# Patient Record
Sex: MALE | Race: BLACK OR AFRICAN AMERICAN | Employment: OTHER | URBAN - METROPOLITAN AREA
[De-identification: names, ages, dates, MRNs, and addresses within clinical notes are randomized per-mention and may not be internally consistent; named-entity substitution may affect disease eponyms.]

---

## 2019-05-08 ENCOUNTER — OFFICE VISIT (OUTPATIENT)
Dept: CARDIOLOGY CLINIC | Age: 74
End: 2019-05-08

## 2019-05-08 ENCOUNTER — HOSPITAL ENCOUNTER (OUTPATIENT)
Dept: LAB | Age: 74
Discharge: HOME | End: 2019-05-08
Payer: OTHER GOVERNMENT

## 2019-05-08 ENCOUNTER — DOCUMENTATION ONLY (OUTPATIENT)
Dept: CARDIOLOGY CLINIC | Age: 74
End: 2019-05-08

## 2019-05-08 VITALS
WEIGHT: 167 LBS | SYSTOLIC BLOOD PRESSURE: 110 MMHG | DIASTOLIC BLOOD PRESSURE: 70 MMHG | HEART RATE: 91 BPM | OXYGEN SATURATION: 96 %

## 2019-05-08 DIAGNOSIS — E78.5 DYSLIPIDEMIA: ICD-10-CM

## 2019-05-08 DIAGNOSIS — K21.9 GASTROESOPHAGEAL REFLUX DISEASE, ESOPHAGITIS PRESENCE NOT SPECIFIED: ICD-10-CM

## 2019-05-08 DIAGNOSIS — I25.118 CORONARY ARTERY DISEASE WITH STABLE ANGINA PECTORIS, UNSPECIFIED VESSEL OR LESION TYPE, UNSPECIFIED WHETHER NATIVE OR TRANSPLANTED HEART (HCC): Primary | ICD-10-CM

## 2019-05-08 DIAGNOSIS — Z91.89 QUIT USING TOBACCO IN REMOTE PAST: ICD-10-CM

## 2019-05-08 DIAGNOSIS — Z82.49 FAMILY HISTORY OF PREMATURE CAD: ICD-10-CM

## 2019-05-08 DIAGNOSIS — R94.39 ABNORMAL NUCLEAR STRESS TEST: ICD-10-CM

## 2019-05-08 DIAGNOSIS — R94.31 ABNORMAL ECG: ICD-10-CM

## 2019-05-08 DIAGNOSIS — R07.89 OTHER CHEST PAIN: ICD-10-CM

## 2019-05-08 LAB
ALBUMIN SERPL-MCNC: 4.1 G/DL (ref 3.4–5)
ALBUMIN/GLOB SERPL: 1.4 {RATIO} (ref 0.8–1.7)
ALP SERPL-CCNC: 53 U/L (ref 45–117)
ALT SERPL-CCNC: 44 U/L (ref 16–61)
ANION GAP SERPL CALC-SCNC: 4 MMOL/L (ref 3–18)
AST SERPL-CCNC: 39 U/L (ref 15–37)
BASOPHILS # BLD: 0 K/UL (ref 0–0.1)
BASOPHILS NFR BLD: 1 % (ref 0–2)
BILIRUB SERPL-MCNC: 0.5 MG/DL (ref 0.2–1)
BUN SERPL-MCNC: 19 MG/DL (ref 7–18)
BUN/CREAT SERPL: 14 (ref 12–20)
CALCIUM SERPL-MCNC: 9.6 MG/DL (ref 8.5–10.1)
CHLORIDE SERPL-SCNC: 110 MMOL/L (ref 100–108)
CO2 SERPL-SCNC: 26 MMOL/L (ref 21–32)
CREAT SERPL-MCNC: 1.37 MG/DL (ref 0.6–1.3)
DIFFERENTIAL METHOD BLD: ABNORMAL
EOSINOPHIL # BLD: 0.1 K/UL (ref 0–0.4)
EOSINOPHIL NFR BLD: 2 % (ref 0–5)
ERYTHROCYTE [DISTWIDTH] IN BLOOD BY AUTOMATED COUNT: 14.6 % (ref 11.6–14.5)
GLOBULIN SER CALC-MCNC: 2.9 G/DL (ref 2–4)
GLUCOSE SERPL-MCNC: 80 MG/DL (ref 74–99)
HCT VFR BLD AUTO: 40.4 % (ref 36–48)
HGB BLD-MCNC: 13.5 G/DL (ref 13–16)
INR PPP: 1 (ref 0.8–1.2)
LYMPHOCYTES # BLD: 1.1 K/UL (ref 0.9–3.6)
LYMPHOCYTES NFR BLD: 18 % (ref 21–52)
MCH RBC QN AUTO: 29.9 PG (ref 24–34)
MCHC RBC AUTO-ENTMCNC: 33.4 G/DL (ref 31–37)
MCV RBC AUTO: 89.6 FL (ref 74–97)
MONOCYTES # BLD: 0.5 K/UL (ref 0.05–1.2)
MONOCYTES NFR BLD: 7 % (ref 3–10)
NEUTS SEG # BLD: 4.6 K/UL (ref 1.8–8)
NEUTS SEG NFR BLD: 72 % (ref 40–73)
PLATELET # BLD AUTO: 186 K/UL (ref 135–420)
PMV BLD AUTO: 11 FL (ref 9.2–11.8)
POTASSIUM SERPL-SCNC: 3.9 MMOL/L (ref 3.5–5.5)
PROT SERPL-MCNC: 7 G/DL (ref 6.4–8.2)
PROTHROMBIN TIME: 12.4 SEC (ref 11.5–15.2)
RBC # BLD AUTO: 4.51 M/UL (ref 4.7–5.5)
SODIUM SERPL-SCNC: 140 MMOL/L (ref 136–145)
WBC # BLD AUTO: 6.3 K/UL (ref 4.6–13.2)

## 2019-05-08 PROCEDURE — 85025 COMPLETE CBC W/AUTO DIFF WBC: CPT

## 2019-05-08 PROCEDURE — 80053 COMPREHEN METABOLIC PANEL: CPT

## 2019-05-08 PROCEDURE — 85610 PROTHROMBIN TIME: CPT

## 2019-05-08 PROCEDURE — 36415 COLL VENOUS BLD VENIPUNCTURE: CPT

## 2019-05-08 RX ORDER — METOPROLOL SUCCINATE 25 MG/1
TABLET, EXTENDED RELEASE ORAL DAILY
COMMUNITY

## 2019-05-08 RX ORDER — IPRATROPIUM BROMIDE AND ALBUTEROL SULFATE 2.5; .5 MG/3ML; MG/3ML
3 SOLUTION RESPIRATORY (INHALATION)
COMMUNITY

## 2019-05-08 RX ORDER — BISMUTH SUBSALICYLATE 262 MG
1 TABLET,CHEWABLE ORAL DAILY
COMMUNITY

## 2019-05-08 RX ORDER — GUAIFENESIN 200 MG/1
200 TABLET ORAL
COMMUNITY

## 2019-05-08 RX ORDER — CETIRIZINE HCL 10 MG
TABLET ORAL
COMMUNITY

## 2019-05-08 RX ORDER — FINASTERIDE 5 MG/1
5 TABLET, FILM COATED ORAL DAILY
COMMUNITY

## 2019-05-08 RX ORDER — NORTRIPTYLINE HYDROCHLORIDE 25 MG/1
CAPSULE ORAL
COMMUNITY

## 2019-05-08 RX ORDER — ACETAMINOPHEN 500 MG
TABLET ORAL
COMMUNITY

## 2019-05-08 RX ORDER — ATORVASTATIN CALCIUM 40 MG/1
20 TABLET, FILM COATED ORAL DAILY
COMMUNITY

## 2019-05-08 RX ORDER — BUDESONIDE AND FORMOTEROL FUMARATE DIHYDRATE 160; 4.5 UG/1; UG/1
2 AEROSOL RESPIRATORY (INHALATION) 2 TIMES DAILY
COMMUNITY

## 2019-05-08 RX ORDER — TAMSULOSIN HYDROCHLORIDE 0.4 MG/1
0.4 CAPSULE ORAL DAILY
COMMUNITY

## 2019-05-08 RX ORDER — CHLORHEXIDINE GLUCONATE 1.2 MG/ML
15 RINSE ORAL 2 TIMES DAILY
COMMUNITY

## 2019-05-08 RX ORDER — ASPIRIN 81 MG/1
TABLET ORAL DAILY
COMMUNITY

## 2019-05-08 RX ORDER — OMEPRAZOLE 20 MG/1
20 CAPSULE, DELAYED RELEASE ORAL DAILY
COMMUNITY

## 2019-05-08 NOTE — PROGRESS NOTES
1. Have you been to the ER, urgent care clinic since your last visit? Hospitalized since your last visit? No  
 
2. Have you seen or consulted any other health care providers outside of the [address] [place] [address] since your last visit? Include any pap smears or colon screening. yes

## 2019-05-08 NOTE — PATIENT INSTRUCTIONS
Instructions Patients Name:   
 
1. You are scheduled to have a heart cath on May 16, 2019  at 9:00am Please check in at 7:00am.  
 
Please go to [facility] in the outpatient parking lot. Once you enter check in with the  there. The  will either give you directions or assist you in getting to the cath holding area. ER to second floor 2. You are not to eat or drink anything after midnight the morning of the procedure. 4. If you are diabetic, do not take your insulin/sugar pill the morning of the procedure. 5. MEDICATION INSTRUCTIONS:   Please take your morning medications with the following special instructions: 
 
[x]          Please make sure to take your Blood pressure medication : Metoprolol. [x]          Take your Aspirin Pre -procedure LABWORK is to be done within one week of your procedure date 6. We encourage families to wait in the waiting room on the first floor while the procedure is being done. The Doctor will come out and talk with you as soon as the procedure is over. 7. There is the possibility that you may spend the night in the hospital, depending on the results of the procedure. This will be determined after the procedure is done. If angioplasty or stent is planned, you will stay at least one day. 8. If you or your family have any questions, please call our office Monday Friday, 9:00 a. m.4:30 p.m.,  At [phone], and ask to speak to one of the nurses. 9. Be sure you have someone to drive you home, you will not be able to drive after the procedure.

## 2019-05-08 NOTE — PROGRESS NOTES
Cath is approved through [facility] - see auth scanned in.  CPT code 32210

## 2019-05-12 PROBLEM — Z82.49 FAMILY HISTORY OF PREMATURE CAD: Status: ACTIVE | Noted: 2019-05-12

## 2019-05-12 PROBLEM — R07.89 OTHER CHEST PAIN: Status: ACTIVE | Noted: 2019-05-12

## 2019-05-12 PROBLEM — E78.5 DYSLIPIDEMIA: Status: ACTIVE | Noted: 2019-05-12

## 2019-05-12 PROBLEM — Z91.89 QUIT USING TOBACCO IN REMOTE PAST: Status: ACTIVE | Noted: 2019-05-12

## 2019-05-12 PROBLEM — R94.31 ABNORMAL ECG: Status: ACTIVE | Noted: 2019-05-12

## 2019-05-12 PROBLEM — K21.9 GERD (GASTROESOPHAGEAL REFLUX DISEASE): Status: ACTIVE | Noted: 2019-05-12

## 2019-05-12 PROBLEM — R94.39 ABNORMAL NUCLEAR STRESS TEST: Status: ACTIVE | Noted: 2019-05-12

## 2019-05-12 RX ORDER — SODIUM CHLORIDE 0.9 % (FLUSH) 0.9 %
5-40 SYRINGE (ML) INJECTION EVERY 8 HOURS
Status: CANCELLED | OUTPATIENT
Start: 2019-05-12

## 2019-05-12 RX ORDER — SODIUM CHLORIDE 0.9 % (FLUSH) 0.9 %
5-40 SYRINGE (ML) INJECTION AS NEEDED
Status: CANCELLED | OUTPATIENT
Start: 2019-05-12

## 2019-05-12 RX ORDER — SODIUM CHLORIDE 9 MG/ML
1000 INJECTION, SOLUTION INTRAVENOUS CONTINUOUS
Status: CANCELLED | OUTPATIENT
Start: 2019-05-12

## 2019-05-12 RX ORDER — ASPIRIN 325 MG
162 TABLET ORAL DAILY
Status: CANCELLED | OUTPATIENT
Start: 2019-05-12

## 2019-05-12 NOTE — PROGRESS NOTES
Subjective:  
   Yasmani Aviles is a 77-year-old man that has been followed for his medical care at the [facility]. The patient had a nuclear stress test done several months ago which was abnormal.  The nuclear stress test was said to have shown a \"large defect\". The patient has not had recent chest pain. The patient did develop pain \"under the collarbone \"which was quite severe approximately a year ago. He has had no recent exertional type chest pain. He does fatigue easily. He has had no limiting shortness of breath, PND or orthopnea. He presently takes metoprolol which he says was given to him to slow his heart rate. Patient's cardiac risk factors are remote smoking/ tobacco exposure, family history, dyslipidemia. Patient Active Problem List  
 Diagnosis Date Noted  Abnormal ECG 05/12/2019  Dyslipidemia 05/12/2019  Family history of premature CAD 05/12/2019  Quit using tobacco in remote past 05/12/2019  GERD (gastroesophageal reflux disease) 05/12/2019  Abnormal nuclear stress test 05/12/2019  Other chest pain 05/12/2019 Current Outpatient Medications Medication Sig Dispense Refill  acetaminophen (TYLENOL) 500 mg tablet Take  by mouth every six (6) hours as needed for Pain.  aspirin delayed-release 81 mg tablet Take  by mouth daily.  budesonide-formoterol (SYMBICORT) 160-4.5 mcg/actuation HFAA Take 2 Puffs by inhalation two (2) times a day.  chlorhexidine (PERIDEX) 0.12 % solution 15 mL by Swish and Spit route two (2) times a day.  guaiFENesin (ORGANIDIN) 200 mg tablet Take 200 mg by mouth every four (4) hours as needed for Congestion.  metoprolol succinate (TOPROL-XL) 25 mg XL tablet Take  by mouth daily.  multivitamin (ONE A DAY) tablet Take 1 Tab by mouth daily.  vit A/vit C/vit E/zinc/copper (PRESERVISION AREDS PO) Take  by mouth.  nortriptyline (PAMELOR) 25 mg capsule Take  by mouth nightly.  omeprazole (PRILOSEC) 20 mg capsule Take 20 mg by mouth daily.  omega-3 fatty acids (FISH OIL CONCENTRATE PO) Take  by mouth.  atorvastatin (LIPITOR) 40 mg tablet Take  by mouth daily.  cetirizine (ZYRTEC) 10 mg tablet Take  by mouth.  finasteride (PROSCAR) 5 mg tablet Take 5 mg by mouth daily.  tamsulosin (FLOMAX) 0.4 mg capsule Take 0.4 mg by mouth daily.  albuterol-ipratropium (DUO-NEB) 2.5 mg-0.5 mg/3 ml nebu 3 mL by Nebulization route. No Known Allergies No past medical history on file. No past surgical history on file. No family history on file. Social History Tobacco Use Smoking Status Former Smoker Smokeless Tobacco Never Used Review of Systems, additional: 
Constitutional: negative Eyes: negative Respiratory: negative Cardiovascular: positive for fatigue Gastrointestinal: negative Musculoskeletal:negative Neurological: negative Behvioral/Psych: negative Endocrine: negative ENT: negative Objective:  
 
Visit Vitals /70 Pulse 91 Wt 167 lb (75.8 kg) SpO2 96% General:  alert, cooperative, no distress Chest Wall: inspection normal - no chest wall deformities or tenderness, respiratory effort normal  
Lung: clear to auscultation bilaterally Heart:  normal rate and regular rhythm, S1 and S2 normal, no murmurs noted, no gallops noted, no JVD Abdomen: soft, non-tender. Bowel sounds normal. No masses,  no organomegaly Extremities: extremities normal, atraumatic, no cyanosis or edema Skin: no rashes Neuro: alert, oriented, normal speech, no focal findings or movement disorder noted EK2019. Sinus rhythm. PVC. Diffuse nondiagnostic ST and T wave abnormalities. Assessment/Plan: ICD-10-CM ICD-9-CM 1.  Coronary artery disease with stable angina pectoris, unspecified vessel or lesion type, unspecified whether native or transplanted heart ([facility] [address] will arrange for cardiac catheterization. Risks and benefits as well as alternatives discussed with the patient today. Patient is in agreement and wishes to proceed I25.118 414.00 AMB POC EKG ROUTINE W/ 12 LEADS, INTER & REP  
  413.9 CBC WITH AUTOMATED DIFF  
   METABOLIC PANEL, COMPREHENSIVE PROTHROMBIN TIME + INR  
2. Abnormal ECG, diffuse nondiagnostic ST and T wave abnormalities. PVCs. R94.31 794.31   
3. Quit using tobacco in remote past, none x13 years Z91.89 V15.89   
4. Gastroesophageal reflux disease, esophagitis presence not specified K21.9 530.81   
5. Dyslipidemia, lipid profile unavailable, patient history E78.5 272.4 6. Family history of premature CAD Z82.49 V17.3 7. Abnormal nuclear stress test, \"large defect \" R94.39 794.39   
8.  Other chest pain, remote R07.89 786.59

## 2019-05-16 ENCOUNTER — HOSPITAL ENCOUNTER (OUTPATIENT)
Age: 74
Setting detail: OUTPATIENT SURGERY
Discharge: HOME | End: 2019-05-16
Attending: INTERNAL MEDICINE | Admitting: INTERNAL MEDICINE
Payer: COMMERCIAL

## 2019-05-16 VITALS
HEART RATE: 90 BPM | BODY MASS INDEX: 23.28 KG/M2 | WEIGHT: 166.31 LBS | HEIGHT: 71 IN | RESPIRATION RATE: 18 BRPM | SYSTOLIC BLOOD PRESSURE: 132 MMHG | OXYGEN SATURATION: 99 % | DIASTOLIC BLOOD PRESSURE: 83 MMHG | TEMPERATURE: 97.5 F

## 2019-05-16 DIAGNOSIS — R94.39 ABNORMAL NUCLEAR STRESS TEST: ICD-10-CM

## 2019-05-16 PROCEDURE — 76210000008 HC OR PH I REC 6 TO 6.5 HR: Performed by: INTERNAL MEDICINE

## 2019-05-16 PROCEDURE — 77030016699 HC CATH ANGI DX INFN1 CARD -A: Performed by: INTERNAL MEDICINE

## 2019-05-16 PROCEDURE — 77030012597: Performed by: INTERNAL MEDICINE

## 2019-05-16 PROCEDURE — C1894 INTRO/SHEATH, NON-LASER: HCPCS | Performed by: INTERNAL MEDICINE

## 2019-05-16 PROCEDURE — 74011250636 HC RX REV CODE- 250/636

## 2019-05-16 PROCEDURE — 77030013761 HC KT HRT LFT ANGI -B: Performed by: INTERNAL MEDICINE

## 2019-05-16 PROCEDURE — 77030013797 HC KT TRNSDUC PRSSR EDWD -A: Performed by: INTERNAL MEDICINE

## 2019-05-16 PROCEDURE — C1760 CLOSURE DEV, VASC: HCPCS | Performed by: INTERNAL MEDICINE

## 2019-05-16 PROCEDURE — 93458 L HRT ARTERY/VENTRICLE ANGIO: CPT | Performed by: INTERNAL MEDICINE

## 2019-05-16 PROCEDURE — 74011250637 HC RX REV CODE- 250/637: Performed by: PHYSICIAN ASSISTANT

## 2019-05-16 PROCEDURE — 74011000258 HC RX REV CODE- 258: Performed by: INTERNAL MEDICINE

## 2019-05-16 PROCEDURE — 77030003629 HC NDL PERC VASC COOK -A: Performed by: INTERNAL MEDICINE

## 2019-05-16 PROCEDURE — 74011636320 HC RX REV CODE- 636/320: Performed by: INTERNAL MEDICINE

## 2019-05-16 PROCEDURE — 74011250636 HC RX REV CODE- 250/636: Performed by: INTERNAL MEDICINE

## 2019-05-16 PROCEDURE — 99152 MOD SED SAME PHYS/QHP 5/>YRS: CPT | Performed by: INTERNAL MEDICINE

## 2019-05-16 RX ORDER — SODIUM CHLORIDE 0.9 % (FLUSH) 0.9 %
5-40 SYRINGE (ML) INJECTION EVERY 8 HOURS
Status: DISCONTINUED | OUTPATIENT
Start: 2019-05-16 | End: 2019-05-16 | Stop reason: HOSPADM

## 2019-05-16 RX ORDER — ACETAMINOPHEN 325 MG/1
650 TABLET ORAL EVERY 8 HOURS
Status: DISCONTINUED | OUTPATIENT
Start: 2019-05-16 | End: 2019-05-20 | Stop reason: HOSPADM

## 2019-05-16 RX ORDER — SODIUM CHLORIDE 0.9 % (FLUSH) 0.9 %
5-40 SYRINGE (ML) INJECTION AS NEEDED
Status: DISCONTINUED | OUTPATIENT
Start: 2019-05-16 | End: 2019-05-20 | Stop reason: HOSPADM

## 2019-05-16 RX ORDER — LIDOCAINE HYDROCHLORIDE 10 MG/ML
INJECTION, SOLUTION EPIDURAL; INFILTRATION; INTRACAUDAL; PERINEURAL AS NEEDED
Status: DISCONTINUED | OUTPATIENT
Start: 2019-05-16 | End: 2019-05-16 | Stop reason: HOSPADM

## 2019-05-16 RX ORDER — SODIUM CHLORIDE 9 MG/ML
1000 INJECTION, SOLUTION INTRAVENOUS CONTINUOUS
Status: DISCONTINUED | OUTPATIENT
Start: 2019-05-16 | End: 2019-05-16 | Stop reason: HOSPADM

## 2019-05-16 RX ORDER — HEPARIN SODIUM 200 [USP'U]/100ML
INJECTION, SOLUTION INTRAVENOUS AS NEEDED
Status: DISCONTINUED | OUTPATIENT
Start: 2019-05-16 | End: 2019-05-16 | Stop reason: HOSPADM

## 2019-05-16 RX ORDER — SODIUM CHLORIDE 450 MG/100ML
75 INJECTION, SOLUTION INTRAVENOUS CONTINUOUS
Status: DISCONTINUED | OUTPATIENT
Start: 2019-05-16 | End: 2019-05-20 | Stop reason: HOSPADM

## 2019-05-16 RX ORDER — FENTANYL CITRATE 50 UG/ML
INJECTION, SOLUTION INTRAMUSCULAR; INTRAVENOUS AS NEEDED
Status: DISCONTINUED | OUTPATIENT
Start: 2019-05-16 | End: 2019-05-16 | Stop reason: HOSPADM

## 2019-05-16 RX ORDER — MIDAZOLAM HYDROCHLORIDE 1 MG/ML
INJECTION, SOLUTION INTRAMUSCULAR; INTRAVENOUS AS NEEDED
Status: DISCONTINUED | OUTPATIENT
Start: 2019-05-16 | End: 2019-05-16 | Stop reason: HOSPADM

## 2019-05-16 RX ORDER — GUAIFENESIN 100 MG/5ML
162 LIQUID (ML) ORAL DAILY
Status: DISCONTINUED | OUTPATIENT
Start: 2019-05-16 | End: 2019-05-16 | Stop reason: HOSPADM

## 2019-05-16 RX ORDER — IODIXANOL 320 MG/ML
INJECTION, SOLUTION INTRAVASCULAR AS NEEDED
Status: DISCONTINUED | OUTPATIENT
Start: 2019-05-16 | End: 2019-05-16 | Stop reason: HOSPADM

## 2019-05-16 RX ORDER — SODIUM CHLORIDE 0.9 % (FLUSH) 0.9 %
5-40 SYRINGE (ML) INJECTION EVERY 8 HOURS
Status: DISCONTINUED | OUTPATIENT
Start: 2019-05-16 | End: 2019-05-20 | Stop reason: HOSPADM

## 2019-05-16 RX ORDER — SODIUM CHLORIDE 0.9 % (FLUSH) 0.9 %
5-40 SYRINGE (ML) INJECTION AS NEEDED
Status: DISCONTINUED | OUTPATIENT
Start: 2019-05-16 | End: 2019-05-16 | Stop reason: HOSPADM

## 2019-05-16 RX ADMIN — ACETAMINOPHEN 650 MG: 325 TABLET, FILM COATED ORAL at 13:21

## 2019-05-16 RX ADMIN — SODIUM CHLORIDE 1000 ML: 900 INJECTION, SOLUTION INTRAVENOUS at 08:24

## 2019-05-16 RX ADMIN — SODIUM CHLORIDE 75 ML/HR: 450 INJECTION, SOLUTION INTRAVENOUS at 11:41

## 2019-05-16 NOTE — PROGRESS NOTES
conducted a pre-surgery visit with Jessa Huerta, who is a 68 y. o.,male. The  provided the following Interventions: 
Initiated a relationship of care and support. Offered prayer and assurance of continued prayers on patient's behalf.  assisted patient with the completion of an advance directive. Today. Copied and charted. Plan: 
Chaplains will continue to follow and will provide pastoral care on an as needed/requested basis.  recommends bedside caregivers page  on duty if patient shows signs of acute spiritual or emotional distress. Dennis 3 Board Certified [employer] [facility]  
[phone]

## 2019-05-16 NOTE — Clinical Note
TRANSFER - OUT REPORT:  
 
Verbal report given to: Wilmar Vasquez. Report consisted of patient's Situation, Background, Assessment and  
Recommendations(SBAR). Opportunity for questions and clarification was provided. Patient transported with a Cardiac Cath Tech / Patient Care Tech. Patient transported to: PACU.

## 2019-05-16 NOTE — H&P
Subjective:  
   Clayton Palencia is a 66-year-old man that has been followed for his medical care at the [address] [place] [address] in [place]. The patient had a nuclear stress test done several months ago which was abnormal.  The nuclear stress test was said to have shown a \"large defect\". The patient has not had recent chest pain. The patient did develop pain \"under the collarbone \"which was quite severe approximately a year ago. He has had no recent exertional type chest pain. He does fatigue easily. He has had no limiting shortness of breath, PND or orthopnea. He presently takes metoprolol which he says was given to him to slow his heart rate. Patient's cardiac risk factors are remote smoking/ tobacco exposure, family history, dyslipidemia.   
  
  
    
Patient Active Problem List  
  Diagnosis Date Noted  Abnormal ECG 05/12/2019  Dyslipidemia 05/12/2019  Family history of premature CAD 05/12/2019  Quit using tobacco in remote past 05/12/2019  GERD (gastroesophageal reflux disease) 05/12/2019  Abnormal nuclear stress test 05/12/2019  Other chest pain 05/12/2019  
  
      
Current Outpatient Medications Medication Sig Dispense Refill  acetaminophen (TYLENOL) 500 mg tablet Take  by mouth every six (6) hours as needed for Pain.      
 aspirin delayed-release 81 mg tablet Take  by mouth daily.      
 budesonide-formoterol (SYMBICORT) 160-4.5 mcg/actuation HFAA Take 2 Puffs by inhalation two (2) times a day.      
 chlorhexidine (PERIDEX) 0.12 % solution 15 mL by Swish and Spit route two (2) times a day.      
 guaiFENesin (ORGANIDIN) 200 mg tablet Take 200 mg by mouth every four (4) hours as needed for Congestion.      
 metoprolol succinate (TOPROL-XL) 25 mg XL tablet Take  by mouth daily.      
 multivitamin (ONE A DAY) tablet Take 1 Tab by mouth daily.      
 vit A/vit C/vit E/zinc/copper (PRESERVISION AREDS PO) Take  by mouth.      
  nortriptyline (PAMELOR) 25 mg capsule Take  by mouth nightly.      
 omeprazole (PRILOSEC) 20 mg capsule Take 20 mg by mouth daily.      
 omega-3 fatty acids (FISH OIL CONCENTRATE PO) Take  by mouth.      
 atorvastatin (LIPITOR) 40 mg tablet Take  by mouth daily.      
 cetirizine (ZYRTEC) 10 mg tablet Take  by mouth.      
 finasteride (PROSCAR) 5 mg tablet Take 5 mg by mouth daily.      
 tamsulosin (FLOMAX) 0.4 mg capsule Take 0.4 mg by mouth daily.      
 albuterol-ipratropium (DUO-NEB) 2.5 mg-0.5 mg/3 ml nebu 3 mL by Nebulization route.      
  
No Known Allergies No past medical history on file. No past surgical history on file. No family history on file. Social History  
  
   
Tobacco Use Smoking Status Former Smoker Smokeless Tobacco Never Used  
  
  
  
Review of Systems, additional: 
Constitutional: negative Eyes: negative Respiratory: negative Cardiovascular: positive for fatigue Gastrointestinal: negative Musculoskeletal:negative Neurological: negative Behvioral/Psych: negative Endocrine: negative ENT: negative 
  
Objective:  
  
Visit Vitals /70 Pulse 91 Wt 167 lb (75.8 kg) SpO2 96%  
  
General:  alert, cooperative, no distress Chest Wall: inspection normal - no chest wall deformities or tenderness, respiratory effort normal  
Lung: clear to auscultation bilaterally Heart:  normal rate and regular rhythm, S1 and S2 normal, no murmurs noted, no gallops noted, no JVD Abdomen: soft, non-tender. Bowel sounds normal. No masses,  no organomegaly Extremities: extremities normal, atraumatic, no cyanosis or edema Skin: no rashes Neuro: alert, oriented, normal speech, no focal findings or movement disorder noted  
  
EK2019. Sinus rhythm. PVC. Diffuse nondiagnostic ST and T wave abnormalities. 
  
Assessment/Plan:  
  
    ICD-10-CM ICD-9-CM    
1.  Coronary artery disease with stable angina pectoris, unspecified vessel or lesion type, unspecified whether native or transplanted heart [facility]),  patient admitted for cardiac catheterization. Risks and benefits as well as alternatives have been discussed with the patient in the office. Patient is in agreement today and wishes to proceed I25.118 414.00 AMB POC EKG ROUTINE W/ 12 LEADS, INTER & REP  
    413.9 CBC WITH AUTOMATED DIFF  
      METABOLIC PANEL, COMPREHENSIVE  
      PROTHROMBIN TIME + INR  
2. Abnormal ECG, diffuse nondiagnostic ST and T wave abnormalities. PVCs. R94.31 794.31    
3. Quit using tobacco in remote past, none x13 years Z91.89 V15.89    
4. Gastroesophageal reflux disease, esophagitis presence not specified K21.9 530.81    
5. Dyslipidemia, lipid profile unavailable, patient history E78.5 272.4    
6. Family history of premature CAD Z82.49 V17.3    
7. Abnormal nuclear stress test, \"large defect \" R94.39 794.39    
8. Other chest pain, remote R07.89 786.59    
  
  
  
  
  
Note Details

## 2019-05-16 NOTE — Clinical Note
Bilateral groin clipped, prepped with ChloraPrep and draped. Safety belt applied to patient.  O2 at 2 liters via NC

## 2019-05-16 NOTE — PERIOP NOTES
Recd care of pt from Cath lab on stretcher. Denies pain or discomforts. Attached to monitor. VSS. No bleeding, swelling or hematoma noted right groin. Pulses palpable x 4. Will cont to monitor. 0911 [id number] [phone]  Pt's son Aura Zhao notified and informed his father will be here until 530pm today. 1312  Box lunch given. Pt reminded of complete bed rest and not to lift right leg. Right groin drg clean, dry and intact. No swelling, bleeding or hematoma noted. No c/o chest pain or shortness of breath. Will cont to monitor. 1800  Pt discharged via w/c accompanied by his son. Discharge instructions given.

## 2019-05-16 NOTE — ACP (ADVANCE CARE PLANNING)
assisted patient with the completion of an advance directive form today. Copied and charted.     Soni Conde   Spiritual Care   [phone]

## 2019-05-16 NOTE — DISCHARGE INSTRUCTIONS
Cardiac Catheterization/Angiography Discharge Instructions    *Check the puncture site frequently for swelling or bleeding. If you see any bleeding, lie down and apply pressure over the area with a clean town or washcloth. Notify your doctor for any redness, swelling, drainage or oozing from the puncture site. Notify your doctor for any fever or chills. *If the leg or arm with the puncture becomes cold, numb or painful, call Dr Perla Mancilla at  [phone]    *Activity should be limited for the next 48 hours. Climb stairs as little as possible and avoid any stooping, bending or strenuous activity for 48 hours. No heavy lifting (anything over 10 pounds) for three days. *Do not drive for 48 hours. *You may resume your usual diet. Drink more fluids than usual.    *Have a responsible person drive you home and stay with you for at least 24 hours after your heart catheterization/angiography. *You may remove the bandage from your Right and Groin in 24 hours. You may shower in 24 hours. No tub baths, hot tubs or swimming for one week. Do not place any lotions, creams, powders, ointments over the puncture site for one week. You may place a clean band-aid over the puncture site each day for 5 days. Change this daily. DISCHARGE SUMMARY from Nurse    PATIENT INSTRUCTIONS:    After general anesthesia or intravenous sedation, for 24 hours or while taking prescription Narcotics:  · Limit your activities  · Do not drive and operate hazardous machinery  · Do not make important personal or business decisions  · Do  not drink alcoholic beverages  · If you have not urinated within 8 hours after discharge, please contact your surgeon on call.     Report the following to your surgeon:  · Excessive pain, swelling, redness or odor of or around the surgical area  · Temperature over 100.5  · Nausea and vomiting lasting longer than 4 hours or if unable to take medications  · Any signs of decreased circulation or nerve impairment to extremity: change in color, persistent  numbness, tingling, coldness or increase pain  · Any questions    What to do at Home:  Recommended activity: Activity as tolerated and no driving for today and No heavy lifting for 1 weeks. If you experience any of the following symptoms bleeding, swelling, numbness of legs, chest pain or shortness of breath, please follow up with nearest ER and call Dr. Ruddy Mckeon. *  Please give a list of your current medications to your Primary Care Provider. *  Please update this list whenever your medications are discontinued, doses are      changed, or new medications (including over-the-counter products) are added. *  Please carry medication information at all times in case of emergency situations. These are general instructions for a healthy lifestyle:    No smoking/ No tobacco products/ Avoid exposure to second hand smoke  Surgeon General's Warning:  Quitting smoking now greatly reduces serious risk to your health. Obesity, smoking, and sedentary lifestyle greatly increases your risk for illness    A healthy diet, regular physical exercise & weight monitoring are important for maintaining a healthy lifestyle    You may be retaining fluid if you have a history of heart failure or if you experience any of the following symptoms:  Weight gain of 3 pounds or more overnight or 5 pounds in a week, increased swelling in our hands or feet or shortness of breath while lying flat in bed. Please call your doctor as soon as you notice any of these symptoms; do not wait until your next office visit. Recognize signs and symptoms of STROKE:    F-face looks uneven    A-arms unable to move or move unevenly    S-speech slurred or non-existent    T-time-call 911 as soon as signs and symptoms begin-DO NOT go       Back to bed or wait to see if you get better-TIME IS BRAIN.     Warning Signs of HEART ATTACK     Call 911 if you have these symptoms:   Chest discomfort. Most heart attacks involve discomfort in the center of the chest that lasts more than a few minutes, or that goes away and comes back. It can feel like uncomfortable pressure, squeezing, fullness, or pain.  Discomfort in other areas of the upper body. Symptoms can include pain or discomfort in one or both arms, the back, neck, jaw, or stomach.  Shortness of breath with or without chest discomfort.  Other signs may include breaking out in a cold sweat, nausea, or lightheadedness. Don't wait more than five minutes to call 911 - MINUTES MATTER! Fast action can save your life. Calling 911 is almost always the fastest way to get lifesaving treatment. Emergency Medical Services staff can begin treatment when they arrive -- up to an hour sooner than if someone gets to the hospital by car. The discharge information has been reviewed with the patient and son. The patient and son verbalized understanding. Discharge medications reviewed with the patient and son and appropriate educational materials and side effects teaching were provided.   ___________________________________________________________________________________________________________________________________  Patient armband removed and shredded

## 2019-05-16 NOTE — Clinical Note
Patient was removed from procedure table and taken back to [facility] due to STEMI in the ER,.

## 2019-05-20 ENCOUNTER — TELEPHONE (OUTPATIENT)
Dept: CARDIOLOGY CLINIC | Age: 74
End: 2019-05-20

## 2019-05-20 NOTE — TELEPHONE ENCOUNTER
Tried to reach patient to schedule him a post hospital follow up visit with Dr. Mckinney Reading     No answer, no voicemail

## 2019-05-31 ENCOUNTER — TELEPHONE (OUTPATIENT)
Dept: CARDIOLOGY CLINIC | Age: 74
End: 2019-05-31

## 2019-05-31 ENCOUNTER — OFFICE VISIT (OUTPATIENT)
Dept: CARDIOLOGY CLINIC | Age: 74
End: 2019-05-31

## 2019-05-31 VITALS
WEIGHT: 168 LBS | SYSTOLIC BLOOD PRESSURE: 114 MMHG | DIASTOLIC BLOOD PRESSURE: 74 MMHG | HEART RATE: 87 BPM | BODY MASS INDEX: 23.43 KG/M2 | OXYGEN SATURATION: 96 %

## 2019-05-31 DIAGNOSIS — Z91.89 QUIT USING TOBACCO IN REMOTE PAST: ICD-10-CM

## 2019-05-31 DIAGNOSIS — R94.31 ABNORMAL ECG: Primary | ICD-10-CM

## 2019-05-31 DIAGNOSIS — E78.5 DYSLIPIDEMIA: ICD-10-CM

## 2019-05-31 DIAGNOSIS — R07.89 OTHER CHEST PAIN: ICD-10-CM

## 2019-05-31 DIAGNOSIS — I42.0 DILATED CARDIOMYOPATHY (HCC): ICD-10-CM

## 2019-05-31 DIAGNOSIS — R94.39 ABNORMAL NUCLEAR STRESS TEST: ICD-10-CM

## 2019-05-31 DIAGNOSIS — Z82.49 FAMILY HISTORY OF PREMATURE CAD: ICD-10-CM

## 2019-05-31 RX ORDER — LISINOPRIL 10 MG/1
10 TABLET ORAL DAILY
Qty: 90 TAB | Refills: 3 | Status: SHIPPED | OUTPATIENT
Start: 2019-05-31

## 2019-05-31 RX ORDER — ERGOCALCIFEROL 1.25 MG/1
50000 CAPSULE ORAL
COMMUNITY

## 2019-05-31 NOTE — TELEPHONE ENCOUNTER
Incoming from pt. Two patient Identifiers confirmed. Advised he is taking vit d 03843 units tab every 14 days. Advised I would update the med rec. No other issues noted. Pt verbalized understanding.

## 2019-05-31 NOTE — PROGRESS NOTES
1. Have you been to the ER, urgent care clinic since your last visit? Hospitalized since your last visit? No     2. Have you seen or consulted any other health care providers outside of the [address] [place] [address] since your last visit? Include any pap smears or colon screening.   No

## 2019-06-02 PROBLEM — I42.0 DILATED CARDIOMYOPATHY (HCC): Status: ACTIVE | Noted: 2019-06-02

## 2019-06-02 NOTE — PROGRESS NOTES
Subjective:      Guilherme Chavira is a 66-year-old man that has been followed for his medical care at the [facility]. The patient had a nuclear stress test done several months ago which was abnormal.  The nuclear stress test was said to have shown a \"large defect\". The patient has not had recent chest pain. The patient did develop pain \"under the collarbone \"which was quite severe approximately a year ago. He has had no recent exertional type chest pain. He does fatigue easily. He has had no limiting shortness of breath, PND or orthopnea. He presently takes metoprolol which he says was given to him to slow his heart rate. Patient recently had cardiac catheterization done (5/16/2019). There was no significant obstructive coronary disease and the ejection fraction was 15 to 20%. In the office today, the patient does report excessive fatigue. He commonly fatigues with showering. He has had no peripheral swelling. He has had no PND or orthopnea. Patient's cardiac risk factors are remote smoking/ tobacco exposure, family history, dyslipidemia. Patient Active Problem List    Diagnosis Date Noted    Dilated cardiomyopathy ([facility] [address] 06/02/2019    Abnormal ECG 05/12/2019    Dyslipidemia 05/12/2019    Family history of premature CAD 05/12/2019    Quit using tobacco in remote past 05/12/2019    GERD (gastroesophageal reflux disease) 05/12/2019    Abnormal nuclear stress test 05/12/2019    Other chest pain 05/12/2019     Current Outpatient Medications   Medication Sig Dispense Refill    lisinopril (PRINIVIL, ZESTRIL) 10 mg tablet Take 1 Tab by mouth daily. 90 Tab 3    acetaminophen (TYLENOL) 500 mg tablet Take  by mouth every six (6) hours as needed for Pain.  aspirin delayed-release 81 mg tablet Take  by mouth daily.  budesonide-formoterol (SYMBICORT) 160-4.5 mcg/actuation HFAA Take 2 Puffs by inhalation two (2) times a day.       chlorhexidine (PERIDEX) 0.12 % solution 15 mL by Swish and Spit route two (2) times a day.  guaiFENesin (ORGANIDIN) 200 mg tablet Take 200 mg by mouth every four (4) hours as needed for Congestion.  metoprolol succinate (TOPROL-XL) 25 mg XL tablet Take  by mouth daily.  multivitamin (ONE A DAY) tablet Take 1 Tab by mouth daily.  vit A/vit C/vit E/zinc/copper (PRESERVISION AREDS PO) Take  by mouth.  nortriptyline (PAMELOR) 25 mg capsule Take  by mouth nightly.  omeprazole (PRILOSEC) 20 mg capsule Take 20 mg by mouth daily.  omega-3 fatty acids (FISH OIL CONCENTRATE PO) Take  by mouth.  atorvastatin (LIPITOR) 40 mg tablet Take 20 mg by mouth daily.  cetirizine (ZYRTEC) 10 mg tablet Take  by mouth.  finasteride (PROSCAR) 5 mg tablet Take 5 mg by mouth daily.  tamsulosin (FLOMAX) 0.4 mg capsule Take 0.4 mg by mouth daily.  albuterol-ipratropium (DUO-NEB) 2.5 mg-0.5 mg/3 ml nebu 3 mL by Nebulization route.  ergocalciferol (VITAMIN D2) 50,000 unit capsule Take 50,000 Units by mouth every fourteen (14) days. Allergies   Allergen Reactions    Other Medication Other (comments)     \"It was a med they gave me to take pictures of procedure\". Stated he couldn't remember what it was. IV contrast?     Penicillin G Other (comments)    Shellfish Derived Other (comments)     Abdominal pain      Past Medical History:   Diagnosis Date    GERD (gastroesophageal reflux disease)      Past Surgical History:   Procedure Laterality Date    ABDOMEN SURGERY PROC UNLISTED      hernia repair      No family history on file.   Social History     Tobacco Use   Smoking Status Former Smoker   Smokeless Tobacco Never Used          Review of Systems, additional:  Constitutional: negative  Eyes: negative  Respiratory: negative  Cardiovascular: positive for fatigue  Gastrointestinal: negative  Musculoskeletal:negative  Neurological: negative  Behvioral/Psych: negative  Endocrine: negative  ENT: negative    Objective:     Visit Vitals  BP 114/74   Pulse 87   Wt 168 lb (76.2 kg)   SpO2 96%   BMI 23.43 kg/m²     General:  alert, cooperative, no distress   Chest Wall: inspection normal - no chest wall deformities or tenderness, respiratory effort normal   Lung: clear to auscultation bilaterally   Heart:  normal rate and regular rhythm, S1 and S2 normal, no murmurs noted, no gallops noted, no JVD   Abdomen: soft, non-tender. Bowel sounds normal. No masses,  no organomegaly   Extremities: extremities normal, atraumatic, no cyanosis or edema Skin: no rashes   Neuro: alert, oriented, normal speech, no focal findings or movement disorder noted     EK2019. Sinus rhythm. PVC. Diffuse nondiagnostic ST and T wave abnormalities. Assessment/Plan:       ICD-10-CM ICD-9-CM    1. Coronary artery disease with stable angina pectoris, unspecified vessel or lesion type, unspecified whether native or transplanted heart [facility]),  cardiac catheterization completed on 2019. No significant struct of CAD. Ejection fraction 15 to 20%. Will start lisinopril 10 mg at night. May substitute Entresto at next visit in 8 weeks. He will follow-up at the [facility]. I25.118 414.00 AMB POC EKG ROUTINE W/ 12 LEADS, INTER & REP     413.9 CBC WITH AUTOMATED DIFF      METABOLIC PANEL, COMPREHENSIVE      PROTHROMBIN TIME + INR   2. Abnormal ECG, diffuse nondiagnostic ST and T wave abnormalities. PVCs. R94.31 794.31    3. Quit using tobacco in remote past, none x13 years Z91.89 V15.89    4. Gastroesophageal reflux disease, esophagitis presence not specified K21.9 530.81    5. Dyslipidemia, lipid profile unavailable, patient history E78.5 272.4    6. Family history of premature CAD Z82.49 V17.3    7. Abnormal nuclear stress test, \"large defect \" R94.39 794.39    8. Other chest pain, remote R07.89 786.59    9. Nonischemic cardiomyopathy, EF 15 to 20%.

## 2020-08-17 NOTE — Clinical Note
ID band present and verified. Family is not present. Fusiform Excision Additional Text (Leave Blank If You Do Not Want): The margin was drawn around the clinically apparent lesion.  A fusiform shape was then drawn on the skin incorporating the lesion and margins.  Incisions were then made along these lines to the appropriate tissue plane and the lesion was extirpated.

## (undated) DEVICE — Device

## (undated) DEVICE — CATH DIAG F6 TL 3DRC 100CM -- INFINITI - ORDER AS EA

## (undated) DEVICE — STERILE POLYISOPRENE POWDER-FREE SURGICAL GLOVES: Brand: PROTEXIS

## (undated) DEVICE — DEVICE VASC CLSR 6FR W/ TWO VIS INDIC FOR FEM ART PUNC

## (undated) DEVICE — SURGICAL PROCEDURE KIT LT HRT CUST

## (undated) DEVICE — INTRODUCER SHTH 6FR CANN L11CM W/ MINI GWIRE UNIQUE SLIX

## (undated) DEVICE — SET ANGIO L6.5IN L BOR 3 W STPCOCK SPIK TBNG

## (undated) DEVICE — PRESSURE MONITORING SET: Brand: TRUWAVE

## (undated) DEVICE — AIRLIFE™ ADULT CUSHION NASAL CANNULA 14 FOOT (4.3) CRUSH-RESISTANT OXYGEN TUBING, AND U/CONNECT-IT ADAPTER: Brand: AIRLIFE™

## (undated) DEVICE — CATHETER DIAG AD 6FR L110CM 145DEG COR GRN HYDRPHLC NYL

## (undated) DEVICE — CATHETER DIAG 6FR L100CM COR NYL JUDKINS L 4 RADPQ W/O SIDE

## (undated) DEVICE — KENDALL 500 SERIES DIAPHORETIC FOAM MONITORING ELECTRODE - TEAR DROP SHAPE ( 5/PK): Brand: KENDALL

## (undated) DEVICE — PERCUTANEOUS ENTRY THINWALL NEEDLE  ONE-PART: Brand: COOK